# Patient Record
Sex: MALE | Race: WHITE
[De-identification: names, ages, dates, MRNs, and addresses within clinical notes are randomized per-mention and may not be internally consistent; named-entity substitution may affect disease eponyms.]

---

## 2018-06-21 ENCOUNTER — HOSPITAL ENCOUNTER (OUTPATIENT)
Dept: HOSPITAL 80 - FIMAGING | Age: 73
End: 2018-06-21
Attending: SURGERY
Payer: COMMERCIAL

## 2018-06-21 DIAGNOSIS — I74.3: Primary | ICD-10-CM

## 2018-06-21 DIAGNOSIS — I73.9: ICD-10-CM

## 2018-06-21 PROCEDURE — 75635 CT ANGIO ABDOMINAL ARTERIES: CPT

## 2018-07-10 ENCOUNTER — HOSPITAL ENCOUNTER (OUTPATIENT)
Dept: HOSPITAL 80 - FCATH | Age: 73
Discharge: HOME | End: 2018-07-10
Attending: INTERNAL MEDICINE
Payer: COMMERCIAL

## 2018-07-10 DIAGNOSIS — I73.9: Primary | ICD-10-CM

## 2018-07-10 LAB
INR PPP: 0.9 (ref 0.83–1.16)
PLATELET # BLD: 199 10^3/UL (ref 150–400)
PROTHROMBIN TIME: 12.4 SEC (ref 12–15)

## 2018-07-10 PROCEDURE — C2623 CATH, TRANSLUMIN, DRUG-COAT: HCPCS

## 2018-07-10 PROCEDURE — B4001ZZ PLAIN RADIOGRAPHY OF ABDOMINAL AORTA USING LOW OSMOLAR CONTRAST: ICD-10-PCS | Performed by: INTERNAL MEDICINE

## 2018-07-10 PROCEDURE — 37224: CPT

## 2018-07-10 PROCEDURE — 75710 ARTERY X-RAYS ARM/LEG: CPT

## 2018-07-10 PROCEDURE — C1725 CATH, TRANSLUMIN NON-LASER: HCPCS

## 2018-07-10 PROCEDURE — 047K3Z1 DILATION OF RIGHT FEMORAL ARTERY USING DRUG-COATED BALLOON, PERCUTANEOUS APPROACH: ICD-10-PCS | Performed by: INTERNAL MEDICINE

## 2018-07-10 PROCEDURE — 93005 ELECTROCARDIOGRAM TRACING: CPT

## 2018-07-10 PROCEDURE — 75625 CONTRAST EXAM ABDOMINL AORTA: CPT

## 2018-07-10 PROCEDURE — C1887 CATHETER, GUIDING: HCPCS

## 2018-07-10 PROCEDURE — C1769 GUIDE WIRE: HCPCS

## 2018-07-10 NOTE — PDGENHP
History & Physical


Chief Complaint: claudication


History of Present Illness: 73 yo male with hx of PAD presents with worsening 

claudication. CTA shows moderate right CFA disease.


Pertinent Past, Social, Family History: active runner, non-smoker


Relevant Physical Exam: decreased right pedal pulses


Cardiorespiratory Assessment: cta-b.  rrr s1 s2.  decreased right pedal pulse, 2

+ LLE

## 2018-07-10 NOTE — CPIP
[f rep st]



                                                       INVASIVE CARDIAC PROCEDURE





DATE OF PROCEDURE:  07/10/2018



INDICATION FOR PROCEDURE:  Claudication.



PROCEDURE:  

1.  Nonselective left groin sheathogram.

2.  Abdominal aortogram.

3.  Catheter placed in right common femoral artery.

4.  Runoff of right lower extremity.

5.  PTA of right ostial SFA utilizing 4 x 20 balloon and drug coated balloon 5.0 x 40 and 6.0 x 40.



HISTORY:  Briefly, this is a 72-year-old male with history of severe peripheral arterial disease.  Th
e patient has been having worsening right lower extremity claudication despite active therapy.  The p
atanum actually in fact has participated in the last 40 [place] [employer] [place] races.  Patient had prior 
bilateral femoral surgery by Dr. Gilbert Marvin and had been evaluated as an outpatient.  CTA performe
d as an outpatient showed what appeared to be moderate disease of the right CFA, SFA junction.  Given
 these findings, patient consented for invasive angiography.



DESCRIPTION OF PROCEDURE:  After informed was obtained, the patient was [facility] where the left
 groin was prepped and draped in sterile fashion.  Using lidocaine, a short 6-Azerbaijani sheath was place
d in the left common artery, verified angiographically.  A 6-Azerbaijani Universal flush catheter was adva
nced over the 3.5 guidewire to the ascending aorta.  Abdominal aortogram was obtained which showed pa
tent distal aorta, patent bilateral common iliac arteries.  The right common iliac artery had 40% dif
fuse disease.  The bilateral internal iliac arteries appeared patent.  External iliac arteries appear
ed to be patent.  The CFA on the left side appeared to be patent.  The CFA on the right side appeared
 to be patent and appeared to be a grafted area from the iliac artery to the femoral artery just belo
w the area of the CFA.  There was a takeoff of the SFA and profunda.  At this takeoff there was a lar
ge amount of heavy calcification appearing to obstruct flow into both the profunda and SFA, although 
distally these vessels appeared to have good runoff.  At this time a long stiff Glidewire was placed 
into the distal CFA.  The Universal flush catheter was removed.  The short 6-Azerbaijani sheath removed.  
A 6-Azerbaijani 45 cm sheath was placed in the right contralateral external iliac artery verified angiogra
phically.



INTERVENTION REPORT:  The patient was administered 8000 heparin IV.  A Choice PT wire was attempted t
o wire across this heavy calcification.  A choice PT wire was chosen secondary to the fact that the p
atient did have intact profunda and SFA runoff and we wanted to preserve this flow without causing an
y type of distal embolization or dissection.  Runoff of the right lower extremity showed mild to mode
rate calcification throughout the SFA but overall a very patent vessel.  The popliteal on the right s
bronwyn artery appeared to be patent and there appeared to be initially 3 vessel runoff to the foot.  Due
 to the patient's elevated creatinine we decided to hold off on further angiographic views to minimiz
e contrast utilization.  A Choice PT wire with a Quick Cross Support 0.014 catheter was utilized and 
this successfully crossed the lesion in the SFA.  The wire was placed distally into the SFA.  We then
 removed the Quick Cross catheter and decided to proceed with PTA of this vessel initially with a 4 x
 20 balloon noncompliant inflated to 8 atmospheres.  After it was deflated, angiogram obtained which 
showed improved patency of the SFA.  We decided to proceed with drug-coated balloon placement with a 
5.0 x 40 Lutonix balloon inflated to 8 atmospheres for 2 minutes time.  After deflation this was torri
uzma and angiography obtained which showed improved patency.  Further improvement of the SFA.  We then
 followed this with a 6.0 x 40 Lutonix drug-coated balloon.  This was inflated to 8 atmospheres and k
ept up for 2 minutes time.  After deflation angiographic images obtained, which showed much improved 
patency of the SFA.  Of note, the calcium did shift to the ostium of the profunda, however, the profu
nda was widely patent, indicating that this calcium was not circumferential and occluding flow.  At t
his time, the wire was removed.  The sheath was removed over the 0.035 wire and replaced with a short
 7-Azerbaijani sheath.  The patient tolerated the procedure well.  No complications.



IMPRESSION:  Successful percutaneous transluminal angioplasty with drug-coated balloon for heavily ca
lcified ostial superficial femoral artery disease.



PLAN:  The patient will have his sheath DC 'd when his ACT is less than 170.  Stenting was not utiliz
ed secondary to the fact the patient had intact flow through his CFA, SFA junction as well as the fac
t that any type of stent procedure may alf off the profunda artery.  Additionally by holding off ami
nting the patient also has all options preserved and if needed can still have a limited endarterectom
y of this site by Dr. Marvin if needed in the future.





Job #:  [id number]

## 2018-07-10 NOTE — PDPROPOC
Sedation Plan of Care


Sedation Plan of Care: mental status noted, patient educated of risks, benefits

, alternatives, patient can tolerate sedation


ASA Classification: ASA 2


Planned drugs: fentanyl, midazolam


Mallampati Score: Class 2


Mallampati Reference Image: 





Patient passed 3-3-2 rule?: Yes

## 2018-08-15 NOTE — GHP
[f 
rep st]



                                                       PREOP HISTORY AND 
PHYSICAL





DATE OF ADMISSION:  08/16/2018



CHIEF COMPLAINT:  Right leg pain.



HISTORY OF PRESENT ILLNESS:  The patient is a 72-year-old male with a history 
of carotid endarterectomy, CABG, and bilateral common femoral artery 
endarterectomies with patch closure on the left and iliofemoral bypass on the 
right, who presents with recurrent right greater than left claudication 
symptoms characterized by calf cramping.  He is here to go over the CT 
angiogram with runoff.  He also had arterial studies performed showing an KISHOR 
of 0.74 on the right and 1.08 on the left.



PAST MEDICAL HISTORY:  Coronary artery disease, carotid artery occlusion, 
hyperlipidemia, hypertension, peripheral vascular disease.



PAST SURGICAL HISTORY:  Knee arthroscopy on the right, carotid endarterectomy, 
coronary artery bypass graft, hernia repair, vasectomy.



MEDICATIONS:  

1.  Amlodipine 5 mg. 

2.  Atorvastatin 80 mg.

3.  Baby aspirin. 

4.  Co Q10. 

5.  Doxazosin 2 mg.

6.  Hydrochlorothiazide 25 mg.  

7.  Hctz-Lisinopril 40 mg.  

8.  Melatonin 5 mg.

9.  Metoprolol tartrate 25 mg.  

10.  Milk thistle. 

11.  Plavix 75 mg. 

12.  Spironolactone 25 mg. 

13.  Trazodone 50 mg.

14.  Vitamin B complex.



ALLERGIES:  Penicillins.



FAMILY MEDICAL HISTORY:  Coronary artery disease.



SOCIAL HISTORY:  He is a nonsmoker, current alcohol user, exercises regularly, 
he is  with 2 kids.  He denies recreational drug use.



PHYSICAL EXAM:  GENERAL:  Well-appearing, well-dressed, no acute distress.  
HEENT:  Normocephalic, atraumatic.  Pupils equal and round.  No gross hearing 
deficits.  Mucous membranes moist.  CARDIAC:  Regular rate and rhythm.  No 
clicks, murmurs or rubs.  CHEST:  Clear to auscultation bilaterally, no 
increased work of breathing.  ABDOMEN:  Soft and nontender.  EXTREMITIES:  
Palpable femoral pulses, palpable left pedal pulses, nonpalpable right pedal 
pulses and weak, but audible, pulses on Doppler.  SKIN:  Warm and dry.  No 
rashes.  NEURO:  Grossly intact.  PSYCHIATRIC:  Appropriate mood and affect.



IMPRESSION/PLAN:  The CT angiogram does reveal some areas of stenosis.  Given 
his symptoms and reduced right pedal pulses, we will plan for surgical femoral-
popliteal bypass graft.  Risks and options were fully discussed with the 
patient.  Risks of surgery include, but are not limited to infection, bleeding, 
risk of limb loss, need for further surgery, failure to heal symptoms, heart 
attack, and death.  Patient understands and wishes to proceed.





Job #:  [id number]

MTDD

## 2018-08-16 ENCOUNTER — HOSPITAL ENCOUNTER (INPATIENT)
Dept: HOSPITAL 80 - F2W | Age: 73
LOS: 1 days | Discharge: HOME | DRG: 254 | End: 2018-08-17
Attending: SURGERY | Admitting: SURGERY
Payer: COMMERCIAL

## 2018-08-16 DIAGNOSIS — I10: ICD-10-CM

## 2018-08-16 DIAGNOSIS — Z95.1: ICD-10-CM

## 2018-08-16 DIAGNOSIS — I25.10: ICD-10-CM

## 2018-08-16 DIAGNOSIS — I70.211: Primary | ICD-10-CM

## 2018-08-16 DIAGNOSIS — E78.5: ICD-10-CM

## 2018-08-16 LAB — PLATELET # BLD: 185 10^3/UL (ref 150–400)

## 2018-08-16 PROCEDURE — 04CK0ZZ EXTIRPATION OF MATTER FROM RIGHT FEMORAL ARTERY, OPEN APPROACH: ICD-10-PCS | Performed by: SURGERY

## 2018-08-16 PROCEDURE — 041K0JH BYPASS RIGHT FEMORAL ARTERY TO RIGHT FEMORAL ARTERY WITH SYNTHETIC SUBSTITUTE, OPEN APPROACH: ICD-10-PCS | Performed by: SURGERY

## 2018-08-16 PROCEDURE — C1768 GRAFT, VASCULAR: HCPCS

## 2018-08-16 RX ADMIN — METOPROLOL TARTRATE SCH MG: 25 TABLET, FILM COATED ORAL at 21:11

## 2018-08-16 RX ADMIN — ASPIRIN SCH MG: 81 TABLET, DELAYED RELEASE ORAL at 13:31

## 2018-08-16 NOTE — POSTOPPROG
Post Op Note


Date of Operation: 08/16/18


Surgeon: Gilbert Marvin


Assistant: Tawana


Anesthesiologist: Fred Hernández


Anesthesia: GET(General Endotracheal)


Pre-op Diagnosis: PVD


Post-op Diagnosis: same


Indication: Claudication symptoms


Procedure: Right femoral endarterectomy and goretex bypass graft


Findings: Significant plaque throughout femoral artery


Inf/Abcess present in the surg proc area at time of surgery?: No


Depth: Deep Incisional (Fascial)


EBL: 100-500


Specimen(s): 





Right femoral plaque

## 2018-08-16 NOTE — PDANEPAE
ANE History of Present Illness





71 yo male with R decreased pedal pulses and claudication for R fem-popliteal 

bypass.





ANE Past Medical History





- Cardiovascular History


Hx Hypertension: Yes


Hx Arrhythmias: No


Hx Chest Pain: No


Hx Coronary Artery / Peripheral Vascular Disease: Yes


Hx CHF / Valvular Disease: No


Hx Palpitations: No


Cardiovascular History Comment: PVD,CAD





- Pulmonary History


Hx COPD: No


Hx Asthma/Reactive Airway Disease: No


Hx Recent Upper Respiratory Infection: No


Hx Oxygen in Use at Home: No


Hx Sleep Apnea: No


Sleep Apnea Screening Result - Last Documented: Positive





- Neurologic History


Hx Cerebrovascular Accident: No


Hx Seizures: No


Hx Dementia: No





- Endocrine History


Hx Diabetes: No


Hypothyroid: No


Obesity: no





- Renal History


Hx Renal Disorders: No





- Liver History


Hx Hepatic Disorders: No





- Neurological & Psychiatric Hx


Hx Neurological and Psychiatric Disorders: No





- Cancer History


Hx Cancer: No





- Congenital Disorder History


Hx Congenital Disorders: No





- GI History


Hx Gastrointestinal Disorders: No





- Other Health History


Other Health History: missing 2 back molars





- Chronic Pain History


Chronic Pain: No





- Surgical History


Prior Surgeries: CABG x 5 2006, Endarterectomy 2009, Left knee meniscus sx 2015

, hernia 2015 fem-pop bypass bilat





ANE Review of Systems


Review of Systems: 








- Exercise capacity


METS (RN): 4 METS





- Systems


Constitutional: Reports: no symptoms


Cardiac: Reports: no symptoms


Muscolosketal: Reports: other (claudication)





ANE Patient History





- Allergies


Allergies/Adverse Reactions: 








Penicillins Allergy (Verified 08/15/18 17:30)


 








- Home Medications


Home Medications: 








Aspirin EC [Aspirin EC 81 mg (*)] 81 mg PO DAILY 02/23/15 [Last Taken 08/15/18]


Atorvastatin Calcium [Lipitor 80 mg] 80 mg PO HS 02/23/15 [Last Taken 08/15/18]


Hydrochlorothiazide [HCTZ (*)] 25 mg PO DAILY 02/23/15 [Last Taken 08/15/18 07:

00]


Metoprolol Tartrate [Lopressor 25 mg (*)] 25 mg PO BID 02/23/15 [Last Taken 08/ 16/18 06:00]


traZODone [traZODONE 50MG (*)] 25 - 50 mg PO HS 02/23/15 [Last Taken 08/15/18]


Doxazosin Mesylate 2 mg PO HS 07/03/18 [Last Taken 08/15/18 20:00]


Lisinopril [Zestril 40 mg (*)] 40 mg PO HS 07/03/18 [Last Taken 08/15/18 19:00]


Melatonin [Melatonin 3 MG (*)] 3 mg PO HS PRN 07/03/18 [Last Taken 08/15/18]


Spironolactone [Aldactone 25 MG (*)] 25 mg PO DAILY 07/03/18 [Last Taken 08/15/

18]


amLODIPine BESYLATE [Norvasc 5 mg (*)] 5 mg PO DAILY 07/03/18 [Last Taken 08/16/ 18 06:00]


Docusate Sodium [Colace 100 MG (*)] 100 mg PO HS 08/15/18 [Last Taken 08/15/18]








- NPO status


NPO Since - Liquids (Date): 08/16/18


NPO Since - Liquids (Time): 05:00


NPO Since - Solids (Date): 08/15/18


NPO Since - Solids (Time): 19:00





- Anes Hx


Anes Hx: no prior problems





- Smoking Hx


Smoking Status: Former smoker





- Alcohol Use


Alcohol Use: Occasionally (14/week)





- Family Anes Hx


Family Anes Hx: neg - N/A


Family Hx Anesthesia Complications: neg





ANE Labs/Vital Signs





- Labs


Result Diagrams: 


 08/16/18 07:35





 08/16/18 07:35





- Labs - BMP


Creatinine: at baseline





- Vital Signs


Blood Pressure: 165/84


Heart Rate: 62


Respiratory Rate: 14


O2 Sat (%): 95


Height: 165.1 cm


Weight: 67.132 kg





ANE Physical Exam





- Airway


Neck exam: FROM


Mallampati Score: Class 2


Mouth exam: normal dental/mouth exam





- Pulmonary


Pulmonary: clear to auscultation





- Cardiovascular


Cardiovascular: regular rate and rhythym





- ASA Status


ASA Status: III





ANE Anesthesia Plan


Anesthesia Plan: general endotracheal anesthesia


Lines/Monitors: arterial line (possible)

## 2018-08-16 NOTE — PDMN
Medical Necessity


Medical necessity: Pt meets inpt criteria per MD order and MCG S-480, Femoral 

Popliteal Bypass, inpt only surg, est LOS>2MN for monitoring/treatment, s/p 

vascular surgery, hypotensive 98/50, comorbidities including CAD, carotid 

artery occ, hyperlipidemia, HTN, periph vasc disease, adv age 73 y/o.

## 2018-08-17 VITALS — DIASTOLIC BLOOD PRESSURE: 56 MMHG | SYSTOLIC BLOOD PRESSURE: 135 MMHG

## 2018-08-17 RX ADMIN — METOPROLOL TARTRATE SCH: 25 TABLET, FILM COATED ORAL at 09:10

## 2018-08-17 RX ADMIN — ASPIRIN SCH MG: 81 TABLET, DELAYED RELEASE ORAL at 09:06

## 2018-08-17 NOTE — ASMTCMCOM
CM Note

 

CM Note                       

Notes:

Patient is POD #1 R femoral endarterectomy and goretex bypass graft. 



He is normally independent and lives with his wife. Has two grown children. PT/OT evals have been 

ordered. Case Management available for any discharge needs. 

 

Date Signed:  08/17/2018 11:33 AM

Electronically Signed By:Elisha Barbosa RN